# Patient Record
Sex: FEMALE | Race: WHITE | NOT HISPANIC OR LATINO | Employment: OTHER | URBAN - METROPOLITAN AREA
[De-identification: names, ages, dates, MRNs, and addresses within clinical notes are randomized per-mention and may not be internally consistent; named-entity substitution may affect disease eponyms.]

---

## 2017-05-02 PROBLEM — R06.02 SOB (SHORTNESS OF BREATH): Status: ACTIVE | Noted: 2017-05-02

## 2017-05-02 PROBLEM — I20.0 UNSTABLE ANGINA: Status: ACTIVE | Noted: 2017-05-02

## 2017-05-02 PROBLEM — I27.20 PULMONARY HYPERTENSION: Status: ACTIVE | Noted: 2017-05-02

## 2017-11-29 PROBLEM — I25.84 CORONARY ARTERY DISEASE DUE TO CALCIFIED CORONARY LESION: Status: ACTIVE | Noted: 2017-11-29

## 2017-11-29 PROBLEM — I25.10 CORONARY ARTERY DISEASE DUE TO CALCIFIED CORONARY LESION: Status: ACTIVE | Noted: 2017-11-29

## 2018-07-10 PROBLEM — E11.51 TYPE 2 DIABETES MELLITUS WITH DIABETIC PERIPHERAL ANGIOPATHY WITHOUT GANGRENE, WITHOUT LONG-TERM CURRENT USE OF INSULIN: Status: ACTIVE | Noted: 2018-07-10

## 2018-07-10 PROBLEM — R25.2 MUSCLE CRAMPS: Status: ACTIVE | Noted: 2018-07-10

## 2018-07-10 PROBLEM — F41.8 SITUATIONAL ANXIETY: Status: ACTIVE | Noted: 2018-07-10

## 2019-02-04 PROBLEM — C18.9 MALIGNANT NEOPLASM OF COLON: Status: ACTIVE | Noted: 2019-02-04

## 2019-04-15 PROBLEM — I20.89 ANGINAL EQUIVALENT: Status: ACTIVE | Noted: 2019-04-15

## 2019-10-07 ENCOUNTER — OFFICE VISIT (OUTPATIENT)
Dept: PODIATRY | Facility: CLINIC | Age: 69
End: 2019-10-07
Payer: MEDICARE

## 2019-10-07 VITALS
HEIGHT: 66 IN | HEART RATE: 72 BPM | RESPIRATION RATE: 18 BRPM | SYSTOLIC BLOOD PRESSURE: 132 MMHG | BODY MASS INDEX: 28.98 KG/M2 | WEIGHT: 180.31 LBS | DIASTOLIC BLOOD PRESSURE: 71 MMHG

## 2019-10-07 DIAGNOSIS — B35.3 TINEA PEDIS, RIGHT: ICD-10-CM

## 2019-10-07 DIAGNOSIS — B35.1 ONYCHOMYCOSIS DUE TO DERMATOPHYTE: ICD-10-CM

## 2019-10-07 DIAGNOSIS — E11.42 DIABETIC POLYNEUROPATHY ASSOCIATED WITH TYPE 2 DIABETES MELLITUS: Primary | ICD-10-CM

## 2019-10-07 PROCEDURE — 11721 DEBRIDE NAIL 6 OR MORE: CPT | Mod: Q9,S$GLB,, | Performed by: PODIATRIST

## 2019-10-07 PROCEDURE — 3075F PR MOST RECENT SYSTOLIC BLOOD PRESS GE 130-139MM HG: ICD-10-PCS | Mod: CPTII,S$GLB,, | Performed by: PODIATRIST

## 2019-10-07 PROCEDURE — 99999 PR PBB SHADOW E&M-EST. PATIENT-LVL III: ICD-10-PCS | Mod: PBBFAC,,, | Performed by: PODIATRIST

## 2019-10-07 PROCEDURE — 3078F DIAST BP <80 MM HG: CPT | Mod: CPTII,S$GLB,, | Performed by: PODIATRIST

## 2019-10-07 PROCEDURE — 99203 OFFICE O/P NEW LOW 30 MIN: CPT | Mod: 25,S$GLB,, | Performed by: PODIATRIST

## 2019-10-07 PROCEDURE — 3078F PR MOST RECENT DIASTOLIC BLOOD PRESSURE < 80 MM HG: ICD-10-PCS | Mod: CPTII,S$GLB,, | Performed by: PODIATRIST

## 2019-10-07 PROCEDURE — 11721 PR DEBRIDEMENT OF NAILS, 6 OR MORE: ICD-10-PCS | Mod: Q9,S$GLB,, | Performed by: PODIATRIST

## 2019-10-07 PROCEDURE — 99999 PR PBB SHADOW E&M-EST. PATIENT-LVL III: CPT | Mod: PBBFAC,,, | Performed by: PODIATRIST

## 2019-10-07 PROCEDURE — 99203 PR OFFICE/OUTPT VISIT, NEW, LEVL III, 30-44 MIN: ICD-10-PCS | Mod: 25,S$GLB,, | Performed by: PODIATRIST

## 2019-10-07 PROCEDURE — 3044F HG A1C LEVEL LT 7.0%: CPT | Mod: CPTII,S$GLB,, | Performed by: PODIATRIST

## 2019-10-07 PROCEDURE — 3075F SYST BP GE 130 - 139MM HG: CPT | Mod: CPTII,S$GLB,, | Performed by: PODIATRIST

## 2019-10-07 PROCEDURE — 1101F PT FALLS ASSESS-DOCD LE1/YR: CPT | Mod: CPTII,S$GLB,, | Performed by: PODIATRIST

## 2019-10-07 PROCEDURE — 3044F PR MOST RECENT HEMOGLOBIN A1C LEVEL <7.0%: ICD-10-PCS | Mod: CPTII,S$GLB,, | Performed by: PODIATRIST

## 2019-10-07 PROCEDURE — 1101F PR PT FALLS ASSESS DOC 0-1 FALLS W/OUT INJ PAST YR: ICD-10-PCS | Mod: CPTII,S$GLB,, | Performed by: PODIATRIST

## 2019-10-08 ENCOUNTER — TELEPHONE (OUTPATIENT)
Dept: PODIATRY | Facility: CLINIC | Age: 69
End: 2019-10-08

## 2019-10-08 DIAGNOSIS — B35.3 TINEA PEDIS, RIGHT: Primary | ICD-10-CM

## 2019-10-08 RX ORDER — ECONAZOLE NITRATE 10 MG/G
CREAM TOPICAL 2 TIMES DAILY
Qty: 30 G | Refills: 3 | Status: SHIPPED | OUTPATIENT
Start: 2019-10-08 | End: 2020-12-28

## 2019-10-08 NOTE — PROGRESS NOTES
Subjective:      Patient ID: Reji Iraheta is a 69 y.o. female.    Chief Complaint: Diabetes Mellitus; Nail Care; Peripheral Neuropathy; and Nail Problem    Reji is a 69 y.o. female who presents to the clinic for evaluation and treatment of diabetic feet. Reji has a past medical history of Bronchitis, Colon cancer, Hiatal hernia, keloid of skin, Hypertension, Raynaud's disease, Reflux, and SOB (shortness of breath). Patient relates having toenails that are fungally infected and in need of trimming.  Denies experiencing pain from the nails with today's exam.  Has not attempted to self treat.  Also, relates having burning/tingling pain secondary to neuropathy, however, notes this is well managed with taking gabapentin daily.  Lastly, she relates having a rash between the Rt. 3rd and 4th toes that has failed to resolve.  Relates attempting use of OTC antifungals with limited success.  Notes the site itches to touch.  Denies any additional pedal complaints.      PCP: NATANAEL Muñiz Jr, MD    Date Last Seen by PCP: 9/5/19    Hemoglobin A1C   Date Value Ref Range Status   08/13/2019 5.4 <5.7 % of total Hgb Final     Comment:     For the purpose of screening for the presence of  diabetes:     <5.7%       Consistent with the absence of diabetes  5.7-6.4%    Consistent with increased risk for diabetes              (prediabetes)  > or =6.5%  Consistent with diabetes     This assay result is consistent with a decreased risk  of diabetes.     Currently, no consensus exists regarding use of  hemoglobin A1c for diagnosis of diabetes in children.     According to American Diabetes Association (ADA)  guidelines, hemoglobin A1c <7.0% represents optimal  control in non-pregnant diabetic patients. Different  metrics may apply to specific patient populations.   Standards of Medical Care in Diabetes(ADA).         12/11/2018 6.5 (H) <5.7 % of total Hgb Final     Comment:     For someone without known diabetes, a hemoglobin  A1c  value of 6.5% or greater indicates that they may have   diabetes and this should be confirmed with a follow-up   test.     For someone with known diabetes, a value <7% indicates   that their diabetes is well controlled and a value   greater than or equal to 7% indicates suboptimal   control. A1c targets should be individualized based on   duration of diabetes, age, comorbid conditions, and   other considerations.     Currently, no consensus exists regarding use of  hemoglobin A1c for diagnosis of diabetes for children.                 Past Medical History:   Diagnosis Date    Bronchitis     Colon cancer     Hiatal hernia     Hx of keloid of skin     Hypertension     Raynaud's disease     Reflux     SOB (shortness of breath)        Past Surgical History:   Procedure Laterality Date    APPENDECTOMY      CARDIAC CATHETERIZATION      CARPAL TUNNEL RELEASE Right     COLON SURGERY  2019    Marimar valentin MD  - colon cancer - second sx     COLONOSCOPY  10/25/2013    Dr. Jamar Novak    ESOPHAGOGASTRODUODENOSCOPY  01/15/2019    PHARYNGEAL DILITATION      TONSILLECTOMY      TUBAL LIGATION         Family History   Problem Relation Age of Onset    Heart disease Mother     Rheum arthritis Mother     Hypertension Father          following fractured hip.     Hypertension Daughter     Breast cancer Maternal Aunt         age unknown    Ovarian cancer Maternal Grandmother        Social History     Socioeconomic History    Marital status:      Spouse name: Not on file    Number of children: 2    Years of education: Not on file    Highest education level: Not on file   Occupational History    Not on file   Social Needs    Financial resource strain: Not on file    Food insecurity:     Worry: Not on file     Inability: Not on file    Transportation needs:     Medical: Not on file     Non-medical: Not on file   Tobacco Use    Smoking status: Never Smoker    Smokeless tobacco:  Never Used   Substance and Sexual Activity    Alcohol use: No    Drug use: Never    Sexual activity: Not Currently     Partners: Male     Birth control/protection: Post-menopausal, See Surgical Hx, None   Lifestyle    Physical activity:     Days per week: Not on file     Minutes per session: Not on file    Stress: Only a little   Relationships    Social connections:     Talks on phone: Not on file     Gets together: Not on file     Attends Hinduism service: Not on file     Active member of club or organization: Not on file     Attends meetings of clubs or organizations: Not on file     Relationship status: Not on file   Other Topics Concern    Not on file   Social History Narrative    Not on file       Current Outpatient Medications   Medication Sig Dispense Refill    acetaminophen (TYLENOL) 500 MG tablet Take by mouth.      blood sugar diagnostic Strp To check BG 2 times daily, to use with insurance preferred meter 200 each 3    blood-glucose meter kit To check BG 2 times daily, to use with insurance preferred meter 1 each kit    clonazePAM (KLONOPIN) 0.5 MG tablet Take 3 at bedtime 90 tablet 2    gabapentin (NEURONTIN) 100 MG capsule TAKE 1 CAPSULE THREE TIMES DAILY 270 capsule 11    JANUVIA 50 mg Tab TAKE 1 TABLET ONE TIME DAILY 90 tablet 4    lancets Misc To check BG 2 times daily, to use with insurance preferred meter 200 each 3    metoprolol succinate (TOPROL-XL) 25 MG 24 hr tablet Take 1 tablet (25 mg total) by mouth once daily. 30 tablet 0    polyethylene glycol 3350 (MIRALAX ORAL) Take by mouth.      aspirin (ECOTRIN) 81 MG EC tablet Take 1 tablet (81 mg total) by mouth once daily.  0    azelastine (ASTELIN) 137 mcg (0.1 %) nasal spray 1 spray (137 mcg total) by Nasal route 2 (two) times daily. 30 mL 1    escitalopram oxalate (LEXAPRO) 10 MG tablet Take 1 tablet (10 mg total) by mouth once daily. 90 tablet 3    levocetirizine (XYZAL) 5 MG tablet Take 1 tablet (5 mg total) by mouth  every evening. 30 tablet 2     No current facility-administered medications for this visit.        Review of patient's allergies indicates:   Allergen Reactions    Arb-angiotensin receptor antagonist Other (See Comments)     Uvulitis      Dyazide [triamterene-hydrochlorothiazid] Other (See Comments)     LLQ pain    Losartan Other (See Comments)     Throat swollen    Metformin Diarrhea    Ace inhibitors Other (See Comments)     cough    Elavil [amitriptyline] Swelling    Tramadol Itching    Vicodin [hydrocodone-acetaminophen] Anxiety         Review of Systems   Constitution: Negative for chills and fever.   Cardiovascular: Negative for claudication and leg swelling.   Skin: Positive for color change, dry skin, itching and nail changes.   Musculoskeletal: Negative for muscle cramps, muscle weakness and myalgias.   Neurological: Positive for paresthesias. Negative for numbness.   Psychiatric/Behavioral: Negative for altered mental status.           Objective:      Physical Exam   Constitutional: She is oriented to person, place, and time. She appears well-developed and well-nourished. No distress.   Cardiovascular:   Pulses:       Dorsalis pedis pulses are 2+ on the right side, and 2+ on the left side.        Posterior tibial pulses are 2+ on the right side, and 2+ on the left side.   CFT is < 3 seconds bilateral.  Pedal hair growth is decreased bilateral.  Mild nonpitting lower extremity edema noted bilateral.  Toes are cool to touch bilateral.   Musculoskeletal: She exhibits no edema or tenderness.   Muscle strength 5/5 in all muscle groups bilateral.  No tenderness nor crepitation with ROM of foot/ankle joints bilateral.  No tenderness with palpation of bilateral foot and ankle.  Bilateral semi-reducible contracture of toes 2-5.  Bilateral hallux abducto valgus.   Neurological: She is alert and oriented to person, place, and time. She has normal strength. A sensory deficit is present.   Protective sensation  per [place]-Zeferino monofilament is intact bilateral.  Vibratory sensation is decreased bilateral.  Light touch is intact bilateral.   Skin: Skin is warm, dry and intact. Capillary refill takes less than 2 seconds. Rash noted. No abrasion, no bruising, no burn, no ecchymosis, no laceration and no lesion noted. Rash is papular. She is not diaphoretic. No erythema.   Pedal skin appears thin bilateral.  The Rt. 3rd-5th toes appear thickened by 2 mm's, elongated by 4 mm's, and discolored with subungual debris. Remaining toenails x 7 appear normotrophic but elongated by 4 mm's.  Dry, scaly papular rash noted to the Rt. 3rd and 4th webspaces.  No break in adjacent skin integrity noted.  No localized sign of infection is present.              Assessment:       Encounter Diagnoses   Name Primary?    Diabetic polyneuropathy associated with type 2 diabetes mellitus Yes    Onychomycosis due to dermatophyte     Tinea pedis, right          Plan:       Reji was seen today for diabetes mellitus, nail care, peripheral neuropathy and nail problem.    Diagnoses and all orders for this visit:    Diabetic polyneuropathy associated with type 2 diabetes mellitus    Onychomycosis due to dermatophyte    Tinea pedis, right      I counseled the patient on her conditions, their implications and medical management.    Advised to begin applying 1% clotrimazole cream to the Rt. 3rd and 4th webspaces BID x 2 weeks.    Discussed with patient a variety of treatment options to address onychomycosis including [employer] Vaporub, topical/oral antifungals, and laser therapy.    Advised to regularly clean shoe gear and shower surfaces to limit exposure.    Advised to be wary of walking barefoot on carpeted surfaces at gyms and hotel rooms.  Also, avoid barefoot walking at public showers and pool areas.    Shoe inspection. Diabetic Foot Education. Patient reminded of the importance of good nutrition and blood sugar control to help prevent podiatric  complications of diabetes. Patient instructed on proper foot hygeine. We discussed wearing proper shoe gear, daily foot inspections, never walking without protective shoe gear, never putting sharp instruments to feet    With patient's permission, nails were aggressively reduced and debrided x 10 to their soft tissue attachment mechanically and with electric , removing all offending nail and debris. Patient relates relief following the procedure. She will continue to monitor the areas daily, inspect her feet, wear protective shoe gear when ambulatory, moisturizer to maintain skin integrity and follow in this office in approximately 4 months, sooner p.r.n.    Follow up in about 4 months (around 2/7/2020).    Fabrizio Pascal DPM

## 2019-10-08 NOTE — TELEPHONE ENCOUNTER
----- Message from Elizabeth Carlisle sent at 10/8/2019 11:57 AM CDT -----  Contact: Reji pt  Type: Needs Medical Advice    Who Called:  Reji  Symptoms (please be specific):  Fungus on her foot  How long has patient had these symptoms:  ongoing  Pharmacy name and phone #:      [id number] - [place], [place] - [address]  [address]  [facility] [address]  Phone: [phone] Fax: [phone]      Best Call Back Number: [phone]  Additional Information: Pls call pt regarding sending her something for the fungus.  Clotrimazole is not working for her

## 2020-09-01 PROBLEM — I20.89 ANGINAL EQUIVALENT: Status: RESOLVED | Noted: 2019-04-15 | Resolved: 2020-09-01

## 2020-09-01 PROBLEM — Z79.899 ON STATIN THERAPY: Status: ACTIVE | Noted: 2020-09-01

## 2020-09-02 PROBLEM — R06.02 SOB (SHORTNESS OF BREATH): Status: RESOLVED | Noted: 2017-05-02 | Resolved: 2020-09-02

## 2020-09-02 PROBLEM — R25.2 MUSCLE CRAMPS: Status: RESOLVED | Noted: 2018-07-10 | Resolved: 2020-09-02

## 2021-03-29 ENCOUNTER — IMMUNIZATION (OUTPATIENT)
Dept: FAMILY MEDICINE | Facility: CLINIC | Age: 71
End: 2021-03-29
Payer: MEDICARE

## 2021-03-29 DIAGNOSIS — Z23 NEED FOR VACCINATION: Primary | ICD-10-CM

## 2021-03-29 PROCEDURE — 91300 COVID-19, MRNA, LNP-S, PF, 30 MCG/0.3 ML DOSE VACCINE: CPT | Mod: PBBFAC | Performed by: FAMILY MEDICINE

## 2021-04-19 ENCOUNTER — IMMUNIZATION (OUTPATIENT)
Dept: FAMILY MEDICINE | Facility: CLINIC | Age: 71
End: 2021-04-19
Payer: MEDICARE

## 2021-04-19 DIAGNOSIS — Z23 NEED FOR VACCINATION: Primary | ICD-10-CM

## 2021-04-19 PROCEDURE — 0002A COVID-19, MRNA, LNP-S, PF, 30 MCG/0.3 ML DOSE VACCINE: CPT | Mod: PBBFAC | Performed by: INTERNAL MEDICINE

## 2021-04-19 PROCEDURE — 91300 COVID-19, MRNA, LNP-S, PF, 30 MCG/0.3 ML DOSE VACCINE: CPT | Mod: PBBFAC | Performed by: INTERNAL MEDICINE

## 2021-07-20 PROBLEM — H25.9 AGE-RELATED CATARACT OF BOTH EYES: Chronic | Status: ACTIVE | Noted: 2021-07-20

## 2021-07-20 PROBLEM — E11.36 DIABETES MELLITUS WITH CATARACT: Chronic | Status: ACTIVE | Noted: 2021-07-20

## 2021-10-12 PROBLEM — E11.42 DIABETIC PERIPHERAL NEUROPATHY ASSOCIATED WITH TYPE 2 DIABETES MELLITUS: Status: ACTIVE | Noted: 2021-10-12

## 2021-10-12 PROBLEM — I63.9 CEREBROVASCULAR ACCIDENT (CVA) DUE TO EMBOLISM: Status: ACTIVE | Noted: 2021-10-12

## 2022-05-24 PROBLEM — Z85.038 HISTORY OF COLON CANCER: Status: ACTIVE | Noted: 2022-05-24

## 2022-07-07 ENCOUNTER — TELEPHONE (OUTPATIENT)
Dept: PODIATRY | Facility: CLINIC | Age: 72
End: 2022-07-07
Payer: MEDICARE

## 2022-07-07 NOTE — TELEPHONE ENCOUNTER
----- Message from Kindra Kwan, Patient Care Assistant sent at 7/7/2022  2:23 PM CDT -----  Regarding: appointment  Contact: pt  Type:  Sooner Appointment Request    Caller is requesting a sooner appointment.  Caller declined first available appointment listed below.  Caller will not accept being placed on the waitlist and is requesting a message be sent to doctor.    Name of Caller:  pt   When is the first available appointment?  8/2022  Symptoms:  new pt est care - toe infected with fungus   Best Call Back Number:  [phone] (home)     Additional Information:  please call pt to advise. Thanks!

## 2022-07-11 ENCOUNTER — OFFICE VISIT (OUTPATIENT)
Dept: PODIATRY | Facility: CLINIC | Age: 72
End: 2022-07-11
Payer: MEDICARE

## 2022-07-11 VITALS — BODY MASS INDEX: 30.76 KG/M2 | WEIGHT: 191.38 LBS | HEIGHT: 66 IN

## 2022-07-11 DIAGNOSIS — B35.1 ONYCHOMYCOSIS DUE TO DERMATOPHYTE: ICD-10-CM

## 2022-07-11 DIAGNOSIS — L60.0 INGROWN NAIL: Primary | ICD-10-CM

## 2022-07-11 DIAGNOSIS — E11.42 DIABETIC POLYNEUROPATHY ASSOCIATED WITH TYPE 2 DIABETES MELLITUS: ICD-10-CM

## 2022-07-11 PROCEDURE — 3051F PR MOST RECENT HEMOGLOBIN A1C LEVEL 7.0 - < 8.0%: ICD-10-PCS | Mod: CPTII,S$GLB,, | Performed by: PODIATRIST

## 2022-07-11 PROCEDURE — 3288F FALL RISK ASSESSMENT DOCD: CPT | Mod: CPTII,S$GLB,, | Performed by: PODIATRIST

## 2022-07-11 PROCEDURE — 99999 PR PBB SHADOW E&M-EST. PATIENT-LVL II: ICD-10-PCS | Mod: PBBFAC,,, | Performed by: PODIATRIST

## 2022-07-11 PROCEDURE — 3008F BODY MASS INDEX DOCD: CPT | Mod: CPTII,S$GLB,, | Performed by: PODIATRIST

## 2022-07-11 PROCEDURE — 3061F PR NEG MICROALBUMINURIA RESULT DOCUMENTED/REVIEW: ICD-10-PCS | Mod: CPTII,S$GLB,, | Performed by: PODIATRIST

## 2022-07-11 PROCEDURE — 3061F NEG MICROALBUMINURIA REV: CPT | Mod: CPTII,S$GLB,, | Performed by: PODIATRIST

## 2022-07-11 PROCEDURE — 1126F AMNT PAIN NOTED NONE PRSNT: CPT | Mod: CPTII,S$GLB,, | Performed by: PODIATRIST

## 2022-07-11 PROCEDURE — 3288F PR FALLS RISK ASSESSMENT DOCUMENTED: ICD-10-PCS | Mod: CPTII,S$GLB,, | Performed by: PODIATRIST

## 2022-07-11 PROCEDURE — 3066F NEPHROPATHY DOC TX: CPT | Mod: CPTII,S$GLB,, | Performed by: PODIATRIST

## 2022-07-11 PROCEDURE — 3008F PR BODY MASS INDEX (BMI) DOCUMENTED: ICD-10-PCS | Mod: CPTII,S$GLB,, | Performed by: PODIATRIST

## 2022-07-11 PROCEDURE — 1126F PR PAIN SEVERITY QUANTIFIED, NO PAIN PRESENT: ICD-10-PCS | Mod: CPTII,S$GLB,, | Performed by: PODIATRIST

## 2022-07-11 PROCEDURE — 99213 OFFICE O/P EST LOW 20 MIN: CPT | Mod: S$GLB,,, | Performed by: PODIATRIST

## 2022-07-11 PROCEDURE — 99999 PR PBB SHADOW E&M-EST. PATIENT-LVL II: CPT | Mod: PBBFAC,,, | Performed by: PODIATRIST

## 2022-07-11 PROCEDURE — 3051F HG A1C>EQUAL 7.0%<8.0%: CPT | Mod: CPTII,S$GLB,, | Performed by: PODIATRIST

## 2022-07-11 PROCEDURE — 3066F PR DOCUMENTATION OF TREATMENT FOR NEPHROPATHY: ICD-10-PCS | Mod: CPTII,S$GLB,, | Performed by: PODIATRIST

## 2022-07-11 PROCEDURE — 1101F PR PT FALLS ASSESS DOC 0-1 FALLS W/OUT INJ PAST YR: ICD-10-PCS | Mod: CPTII,S$GLB,, | Performed by: PODIATRIST

## 2022-07-11 PROCEDURE — 99213 PR OFFICE/OUTPT VISIT, EST, LEVL III, 20-29 MIN: ICD-10-PCS | Mod: S$GLB,,, | Performed by: PODIATRIST

## 2022-07-11 PROCEDURE — 1101F PT FALLS ASSESS-DOCD LE1/YR: CPT | Mod: CPTII,S$GLB,, | Performed by: PODIATRIST

## 2022-07-11 RX ORDER — MUPIROCIN 20 MG/G
OINTMENT TOPICAL DAILY
Qty: 30 G | Refills: 1 | Status: SHIPPED | OUTPATIENT
Start: 2022-07-11 | End: 2024-02-07

## 2022-07-11 RX ORDER — GABAPENTIN 300 MG/1
CAPSULE ORAL
COMMUNITY
End: 2022-07-13 | Stop reason: SDUPTHER

## 2022-07-11 RX ORDER — METRONIDAZOLE 500 MG/1
TABLET ORAL
COMMUNITY
End: 2022-07-27

## 2022-07-11 RX ORDER — DIAZEPAM 2 MG/1
TABLET ORAL
COMMUNITY
End: 2022-07-27

## 2022-07-11 RX ORDER — ONDANSETRON 4 MG/1
TABLET, ORALLY DISINTEGRATING ORAL
COMMUNITY
End: 2023-07-31

## 2022-07-11 RX ORDER — ACETAMINOPHEN AND CODEINE PHOSPHATE 300; 30 MG/1; MG/1
TABLET ORAL
COMMUNITY
End: 2023-01-27

## 2022-07-11 NOTE — PROGRESS NOTES
"Subjective:      Patient ID: Reji Iraheta is a 72 y.o. female.    Chief Complaint: Nail Problem (2nd toe was red and inflamed ,4th toe nail lifted off) and Diabetes Mellitus    Reji is a 72 y.o. female with a past medical history of Bronchitis, Colon cancer (2019), Edema, Hiatal hernia, keloid of skin, Hypertension, Raynaud's disease, Reflux, and SOB (shortness of breath). Patient relates having a previously infected ingrown Rt. 2nd toenail.  States this appeared "red and angry" last week, however, states the site has since healed on its own.  When symptomatic, pain from the nail was noted to be sharp and aggravated with pressure.  Notes some residual pain at the site.  Inquires as to whether or not intervention is warranted.   Also, notes recent avulsion of the Rt. 4th toenail.  States the nail detached from the underlying nail bed with subsequent avulsion.  Denies this begin associated with pain.  Inquires as to whether the nail will eventually grow to length.   Lastly, notes a recurring fungal infection to the dorsal 3rd webspace of the Rt. foot.  Denies pruritis from the site with today's exam.  Has not attempted to treat with the past Rx of clotrimazole. Denies any additional pedal complaints.      PCP: NATANAEL Muñiz Jr, MD    Date Last Seen by PCP: 1/22    Hemoglobin A1C   Date Value Ref Range Status   01/12/2022 7.1 (H) <5.7 % of total Hgb Final     Comment:     For someone without known diabetes, a hemoglobin A1c  value of 6.5% or greater indicates that they may have   diabetes and this should be confirmed with a follow-up   test.     For someone with known diabetes, a value <7% indicates   that their diabetes is well controlled and a value   greater than or equal to 7% indicates suboptimal   control. A1c targets should be individualized based on   duration of diabetes, age, comorbid conditions, and   other considerations.     Currently, no consensus exists regarding use of  hemoglobin A1c for diagnosis " of diabetes for children.         07/15/2021 6.2 (H) <5.7 % of total Hgb Final     Comment:     For someone without known diabetes, a hemoglobin   A1c value between 5.7% and 6.4% is consistent with  prediabetes and should be confirmed with a   follow-up test.     For someone with known diabetes, a value <7%  indicates that their diabetes is well controlled. A1c  targets should be individualized based on duration of  diabetes, age, comorbid conditions, and other  considerations.     This assay result is consistent with an increased risk  of diabetes.     Currently, no consensus exists regarding use of  hemoglobin A1c for diagnosis of diabetes for children.        12/09/2020 6.8 (H) <5.7 % of total Hgb Final     Comment:     For someone without known diabetes, a hemoglobin A1c  value of 6.5% or greater indicates that they may have   diabetes and this should be confirmed with a follow-up   test.     For someone with known diabetes, a value <7% indicates   that their diabetes is well controlled and a value   greater than or equal to 7% indicates suboptimal   control. A1c targets should be individualized based on   duration of diabetes, age, comorbid conditions, and   other considerations.     Currently, no consensus exists regarding use of  hemoglobin A1c for diagnosis of diabetes for children.                 Past Medical History:   Diagnosis Date    Bronchitis     Colon cancer 2019    Edema     feet/ankle    Hiatal hernia     Hx of keloid of skin     Hypertension     Raynaud's disease     Reflux     SOB (shortness of breath)        Past Surgical History:   Procedure Laterality Date    APPENDECTOMY      CARDIAC CATHETERIZATION      CARPAL TUNNEL RELEASE Right     COLON SURGERY  05/2019    Marimar valentin MD  - colon cancer - second sx     COLONOSCOPY  10/25/2013    Dr. Jamar Novak    COLONOSCOPY W/ BIOPSIES  01/22/2020    ESOPHAGOGASTRODUODENOSCOPY  01/15/2019    PHARYNGEAL DILITATION       TONSILLECTOMY      TUBAL LIGATION         Family History   Problem Relation Age of Onset    Heart disease Mother     Rheum arthritis Mother     Hypertension Father          following fractured hip.     Hypertension Daughter     Cancer Maternal Aunt     Ovarian cancer Maternal Grandmother     Cancer Maternal Grandmother     Breast cancer Paternal Aunt        Social History     Socioeconomic History    Marital status:     Number of children: 2   Tobacco Use    Smoking status: Never Smoker    Smokeless tobacco: Never Used   Substance and Sexual Activity    Alcohol use: No    Drug use: Never    Sexual activity: Not Currently     Partners: Male     Birth control/protection: Post-menopausal, See Surgical Hx, None       Current Outpatient Medications   Medication Sig Dispense Refill    acetaminophen (TYLENOL) 500 MG tablet Take by mouth.      acetaminophen-codeine 300-30mg (TYLENOL #3) 300-30 mg Tab acetaminophen 300 mg-codeine 30 mg tablet   TAKE 1 TABLET BY MOUTH EVERY 8 HOURS AS NEEDED FOR PAIN      alcohol swabs (BD ALCOHOL SWABS) PadM Apply 1 each topically once daily. 100 each 3    atorvastatin (LIPITOR) 20 MG tablet TAKE 1 TABLET (20 MG TOTAL) BY MOUTH ONCE DAILY. 90 tablet 3    azelastine (ASTELIN) 137 mcg (0.1 %) nasal spray 1 SPRAY (137 MCG TOTAL) BY NASAL ROUTE 2 (TWO) TIMES DAILY. 30 mL 1    blood glucose control, low (TRUE METRIX LEVEL 1) Soln 1 application by Misc.(Non-Drug; Combo Route) route once daily. 3 each 3    blood sugar diagnostic Strp To check BG 2 times daily, to use with insurance preferred meter 200 each 3    blood-glucose meter (TRUE METRIX AIR GLUCOSE METER) kit Use daily prn glucose testing 1 each 0    clonazePAM (KLONOPIN) 0.5 MG tablet TAKE 3 TABLETS BY MOUTH AT BEDTIME 90 tablet 3    diazePAM (VALIUM) 2 MG tablet diazepam 2 mg tablet   TAKE ONE TABLET 30 MIN PRIOR TO MRI THEN TAKE 2ND TABLET      esomeprazole (NEXIUM) 40 MG capsule Take 1 capsule (40  mg total) by mouth before breakfast. 90 capsule 3    folic acid/multivit-min/lutein (CENTRUM SILVER ORAL) Take 1 tablet by mouth once daily.      gabapentin (NEURONTIN) 100 MG capsule Take 1 capsule (100 mg total) by mouth 3 (three) times daily. (Patient taking differently: Take 300 mg by mouth 2 (two) times daily. Per dr boone) 270 capsule 3    gabapentin (NEURONTIN) 300 MG capsule gabapentin 300 mg capsule   TAKE 1 CAPSULE BY MOUTH THREE TIMES A DAY      JANUVIA 50 mg Tab TAKE 1 TABLET EVERY DAY 90 tablet 3    ketorolac 0.5% (ACULAR) 0.5 % Drop       lancets Misc To check BG 2 times daily, to use with insurance preferred meter 200 each 3    metoprolol succinate (TOPROL-XL) 25 MG 24 hr tablet TAKE 1 TABLET (25 MG TOTAL) BY MOUTH ONCE DAILY. 90 tablet 3    metroNIDAZOLE (FLAGYL) 500 MG tablet metronidazole 500 mg tablet   TAKE 1 TABLET (500 MG TOTAL) BY MOUTH 3 (THREE) TIMES DAILY. FOR 7 DAYS      mupirocin (BACTROBAN) 2 % ointment Apply topically once daily. 30 g 1    mv-mn/iron/folic acid/herb 190 (VITAMIN D3 COMPLETE ORAL) Take 1 tablet by mouth once daily.      nystatin (MYCOSTATIN) powder Apply topically 3 (three) times daily. 30 g 1    ondansetron (ZOFRAN-ODT) 4 MG TbDL ondansetron 4 mg disintegrating tablet   TAKE 1 TABLET (4 MG TOTAL) BY MOUTH ONCE FOR 1 DOSE      polyethylene glycol 3350 (MIRALAX ORAL) Take by mouth.      spironolactone (ALDACTONE) 25 MG tablet TAKE 1 TABLET (25 MG TOTAL) BY MOUTH ONCE DAILY. 90 tablet 3     No current facility-administered medications for this visit.       Review of patient's allergies indicates:   Allergen Reactions    Arb-angiotensin receptor antagonist Other (See Comments)     Uvulitis      Dyazide [triamterene-hydrochlorothiazid] Other (See Comments)     LLQ pain    Losartan Other (See Comments)     Throat swollen    Metformin Diarrhea    Ace inhibitors Other (See Comments)     cough    Elavil [amitriptyline] Swelling    Tramadol Itching     Vicodin [hydrocodone-acetaminophen] Anxiety         Review of Systems   Constitutional: Negative for chills and fever.   Cardiovascular: Negative for claudication and leg swelling.   Skin: Positive for color change, dry skin, itching and nail changes.   Musculoskeletal: Negative for muscle cramps, muscle weakness and myalgias.   Neurological: Positive for paresthesias. Negative for numbness.   Psychiatric/Behavioral: Negative for altered mental status.           Objective:      Physical Exam  Constitutional:       General: She is not in acute distress.     Appearance: She is well-developed. She is not diaphoretic.   Cardiovascular:      Pulses:           Dorsalis pedis pulses are 2+ on the right side and 2+ on the left side.        Posterior tibial pulses are 2+ on the right side and 2+ on the left side.      Comments: CFT is < 3 seconds bilateral.  Pedal hair growth is decreased bilateral.  Mild nonpitting lower extremity edema noted bilateral.  Toes are cool to touch bilateral.  Musculoskeletal:         General: No tenderness.      Comments: Muscle strength 5/5 in all muscle groups bilateral.  No tenderness nor crepitation with ROM of foot/ankle joints bilateral.  Mild pain with palpation to the medial nail fold of the Rt. 2nd toe.   Bilateral semi-reducible contracture of toes 2-5.  Bilateral hallux abducto valgus.   Skin:     General: Skin is warm and dry.      Capillary Refill: Capillary refill takes less than 2 seconds.      Findings: Rash present. No abrasion, bruising, burn, ecchymosis, erythema, laceration or lesion. Rash is papular.      Comments: Pedal skin appears thin bilateral.  Avulsion of the Rt. 4th toenail with exposed, epithelialized nail bed.  Incurvation noted to the medial border of the Rt. 2nd toenail.  No localized sign of infection noted.  The Rt. 3rd and 5th toenails appear mycotic.  Remaining toenails x 6 appear normotrophic.   Dry, scaly papular rash noted to the Rt. 3rd and 4th  webspaces.  No break in adjacent skin integrity noted.  No localized sign of infection is present.    Neurological:      Mental Status: She is alert and oriented to person, place, and time.      Sensory: Sensory deficit present.      Comments: Protective sensation per [place]-Zeferino monofilament is intact bilateral.  Vibratory sensation is decreased bilateral.  Light touch is intact bilateral.               Assessment:       Encounter Diagnoses   Name Primary?    Ingrown nail Yes    Onychomycosis due to dermatophyte     Diabetic polyneuropathy associated with type 2 diabetes mellitus          Plan:       Reji was seen today for nail problem and diabetes mellitus.    Diagnoses and all orders for this visit:    Ingrown nail  -     mupirocin (BACTROBAN) 2 % ointment; Apply topically once daily.    Onychomycosis due to dermatophyte    Diabetic polyneuropathy associated with type 2 diabetes mellitus      I counseled the patient on her conditions, their implications and medical management.    Advised to resume applying 1% clotrimazole cream to the Rt. 3rd and 4th webspaces BID x 2 weeks.    Discussed treatment options for ingrowing nails including matrixectomy vs slant back procedure. Patient elects for a total matrixectomy of the Rt. 2nd and 4th toes.  This is to be scheduled at her convenience.      In the meantime, she is to apply mupirocin to the Rt. 2nd nail groove once daily to minimize recurrence of infection to the area.     RTC as mentioned.    Fabrizio Pascal DPM

## 2022-07-24 PROBLEM — I25.84 CORONARY ARTERY DISEASE DUE TO CALCIFIED CORONARY LESION: Chronic | Status: ACTIVE | Noted: 2017-11-29

## 2022-07-24 PROBLEM — I25.10 CORONARY ARTERY DISEASE DUE TO CALCIFIED CORONARY LESION: Chronic | Status: ACTIVE | Noted: 2017-11-29

## 2022-07-24 PROBLEM — C18.9 MALIGNANT NEOPLASM OF COLON: Chronic | Status: ACTIVE | Noted: 2019-02-04

## 2022-07-24 PROBLEM — E11.51 TYPE 2 DIABETES MELLITUS WITH DIABETIC PERIPHERAL ANGIOPATHY WITHOUT GANGRENE, WITHOUT LONG-TERM CURRENT USE OF INSULIN: Chronic | Status: ACTIVE | Noted: 2018-07-10

## 2022-07-28 ENCOUNTER — TELEPHONE (OUTPATIENT)
Dept: PODIATRY | Facility: CLINIC | Age: 72
End: 2022-07-28
Payer: MEDICARE

## 2022-07-28 NOTE — TELEPHONE ENCOUNTER
----- Message from Ailyn Buchanan sent at 7/28/2022  2:09 PM CDT -----  Contact: Pt  Type: Needs Medical Advice    Who Called:  Pt    Best Call Back Number: [phone]    Additional Information: Pt has appt on Monday to remove a toenail & she wants to know if she can also have the toenail next to it removed at the same time.    Please call back. Thanks.

## 2022-07-29 NOTE — TELEPHONE ENCOUNTER
LMOM on that we may have to reschedule her procedure that we are currently waiting on a shipment of the medication will let her know on Monday.

## 2022-08-01 ENCOUNTER — OFFICE VISIT (OUTPATIENT)
Dept: PODIATRY | Facility: CLINIC | Age: 72
End: 2022-08-01
Payer: MEDICARE

## 2022-08-01 VITALS — BODY MASS INDEX: 30.86 KG/M2 | HEIGHT: 66 IN | WEIGHT: 192 LBS

## 2022-08-01 DIAGNOSIS — M79.674 TOE PAIN, RIGHT: ICD-10-CM

## 2022-08-01 DIAGNOSIS — L60.0 INGROWN NAIL: Primary | ICD-10-CM

## 2022-08-01 PROCEDURE — 99499 NO LOS: ICD-10-PCS | Mod: S$GLB,,, | Performed by: PODIATRIST

## 2022-08-01 PROCEDURE — 1126F PR PAIN SEVERITY QUANTIFIED, NO PAIN PRESENT: ICD-10-PCS | Mod: CPTII,S$GLB,, | Performed by: PODIATRIST

## 2022-08-01 PROCEDURE — 1126F AMNT PAIN NOTED NONE PRSNT: CPT | Mod: CPTII,S$GLB,, | Performed by: PODIATRIST

## 2022-08-01 PROCEDURE — 99999 PR PBB SHADOW E&M-EST. PATIENT-LVL II: ICD-10-PCS | Mod: PBBFAC,,, | Performed by: PODIATRIST

## 2022-08-01 PROCEDURE — 3066F NEPHROPATHY DOC TX: CPT | Mod: CPTII,S$GLB,, | Performed by: PODIATRIST

## 2022-08-01 PROCEDURE — 1101F PT FALLS ASSESS-DOCD LE1/YR: CPT | Mod: CPTII,S$GLB,, | Performed by: PODIATRIST

## 2022-08-01 PROCEDURE — 11730 AVULSION NAIL PLATE SIMPLE 1: CPT | Mod: T6,S$GLB,, | Performed by: PODIATRIST

## 2022-08-01 PROCEDURE — 3008F PR BODY MASS INDEX (BMI) DOCUMENTED: ICD-10-PCS | Mod: CPTII,S$GLB,, | Performed by: PODIATRIST

## 2022-08-01 PROCEDURE — 3051F HG A1C>EQUAL 7.0%<8.0%: CPT | Mod: CPTII,S$GLB,, | Performed by: PODIATRIST

## 2022-08-01 PROCEDURE — 3051F PR MOST RECENT HEMOGLOBIN A1C LEVEL 7.0 - < 8.0%: ICD-10-PCS | Mod: CPTII,S$GLB,, | Performed by: PODIATRIST

## 2022-08-01 PROCEDURE — 99999 PR PBB SHADOW E&M-EST. PATIENT-LVL II: CPT | Mod: PBBFAC,,, | Performed by: PODIATRIST

## 2022-08-01 PROCEDURE — 3008F BODY MASS INDEX DOCD: CPT | Mod: CPTII,S$GLB,, | Performed by: PODIATRIST

## 2022-08-01 PROCEDURE — 99499 UNLISTED E&M SERVICE: CPT | Mod: S$GLB,,, | Performed by: PODIATRIST

## 2022-08-01 PROCEDURE — 3061F PR NEG MICROALBUMINURIA RESULT DOCUMENTED/REVIEW: ICD-10-PCS | Mod: CPTII,S$GLB,, | Performed by: PODIATRIST

## 2022-08-01 PROCEDURE — 1101F PR PT FALLS ASSESS DOC 0-1 FALLS W/OUT INJ PAST YR: ICD-10-PCS | Mod: CPTII,S$GLB,, | Performed by: PODIATRIST

## 2022-08-01 PROCEDURE — 3288F FALL RISK ASSESSMENT DOCD: CPT | Mod: CPTII,S$GLB,, | Performed by: PODIATRIST

## 2022-08-01 PROCEDURE — 3066F PR DOCUMENTATION OF TREATMENT FOR NEPHROPATHY: ICD-10-PCS | Mod: CPTII,S$GLB,, | Performed by: PODIATRIST

## 2022-08-01 PROCEDURE — 11732 NAIL REMOVAL: ICD-10-PCS | Mod: T7,S$GLB,, | Performed by: PODIATRIST

## 2022-08-01 PROCEDURE — 11732 AVLSN NAIL PLATE SIMPLE EACH: CPT | Mod: T7,S$GLB,, | Performed by: PODIATRIST

## 2022-08-01 PROCEDURE — 11730 NAIL REMOVAL: ICD-10-PCS | Mod: T6,S$GLB,, | Performed by: PODIATRIST

## 2022-08-01 PROCEDURE — 3061F NEG MICROALBUMINURIA REV: CPT | Mod: CPTII,S$GLB,, | Performed by: PODIATRIST

## 2022-08-01 PROCEDURE — 3288F PR FALLS RISK ASSESSMENT DOCUMENTED: ICD-10-PCS | Mod: CPTII,S$GLB,, | Performed by: PODIATRIST

## 2022-08-01 NOTE — PATIENT INSTRUCTIONS
"POST NAIL PROCEDURE INSTRUCTIONS    1. Leave bandage intact for 12-24 hours. If the bandage sticks as you try to remove it, soak it in warm water until it lifts off.    2. Wash the toes with antibacterial soap and water separate from the body.    3. Dry foot completely after washing, and apply a small amount of bactroban ointment and a fabric or cloth bandaid ("plastic" bandaids tend to lift off with ointment use).  Wear open-toed shoes as needed for comfort.     4. Take Tylenol as needed for pain.     5. Your toe may drain for the next few days. Normal drainage is yellow-to-pink, and clear, much like the fluid in a blister. Watch for redness spreading up your toe into your foot, white thick drainage (pus), pain unrelieved by medication, or nausea/vomiting/fever/chills. These are signs of infection. Please call the clinic or visit your doctor.    "

## 2022-08-01 NOTE — PROGRESS NOTES
Subjective:      Patient ID: Reji Iraheta is a 72 y.o. female.    Chief Complaint: Ingrown Toenail (Rt 2nd 3rd 4th)    Patient presents to clinic for surgical removal of an ingrown toenail involving the Rt. 2nd and 3rd toenails, and would like the nail matrix of the Rt. 4th toe treated as well. Describes sharp pain from the affected toes with applied pressure from shoe gear.  Symptoms are alleviated with rest.  Has not been currently treating.   Denies experiencing N/V/F/C/D.  .Denies any additional pedal complaints.        Hemoglobin A1C   Date Value Ref Range Status   01/12/2022 7.1 (H) <5.7 % of total Hgb Final     Comment:     For someone without known diabetes, a hemoglobin A1c  value of 6.5% or greater indicates that they may have   diabetes and this should be confirmed with a follow-up   test.     For someone with known diabetes, a value <7% indicates   that their diabetes is well controlled and a value   greater than or equal to 7% indicates suboptimal   control. A1c targets should be individualized based on   duration of diabetes, age, comorbid conditions, and   other considerations.     Currently, no consensus exists regarding use of  hemoglobin A1c for diagnosis of diabetes for children.         07/15/2021 6.2 (H) <5.7 % of total Hgb Final     Comment:     For someone without known diabetes, a hemoglobin   A1c value between 5.7% and 6.4% is consistent with  prediabetes and should be confirmed with a   follow-up test.     For someone with known diabetes, a value <7%  indicates that their diabetes is well controlled. A1c  targets should be individualized based on duration of  diabetes, age, comorbid conditions, and other  considerations.     This assay result is consistent with an increased risk  of diabetes.     Currently, no consensus exists regarding use of  hemoglobin A1c for diagnosis of diabetes for children.        12/09/2020 6.8 (H) <5.7 % of total Hgb Final     Comment:     For someone without  known diabetes, a hemoglobin A1c  value of 6.5% or greater indicates that they may have   diabetes and this should be confirmed with a follow-up   test.     For someone with known diabetes, a value <7% indicates   that their diabetes is well controlled and a value   greater than or equal to 7% indicates suboptimal   control. A1c targets should be individualized based on   duration of diabetes, age, comorbid conditions, and   other considerations.     Currently, no consensus exists regarding use of  hemoglobin A1c for diagnosis of diabetes for children.                 Past Medical History:   Diagnosis Date    Bronchitis     Colon cancer     Edema     feet/ankle    Hiatal hernia     Hx of keloid of skin     Hypertension     Raynaud's disease     Reflux     SOB (shortness of breath)        Past Surgical History:   Procedure Laterality Date    APPENDECTOMY      CARDIAC CATHETERIZATION      CARPAL TUNNEL RELEASE Right     COLON SURGERY  2019    Marimar valentin MD  - colon cancer - second sx     COLONOSCOPY  10/25/2013    Dr. Jamar Novak    COLONOSCOPY W/ BIOPSIES  2020    ESOPHAGOGASTRODUODENOSCOPY  01/15/2019    PHARYNGEAL DILITATION      TONSILLECTOMY      TUBAL LIGATION         Family History   Problem Relation Age of Onset    Heart disease Mother     Rheum arthritis Mother     Hypertension Father          following fractured hip.     Hypertension Daughter     Cancer Maternal Aunt     Ovarian cancer Maternal Grandmother     Cancer Maternal Grandmother     Breast cancer Paternal Aunt        Social History     Socioeconomic History    Marital status:     Number of children: 2   Tobacco Use    Smoking status: Never Smoker    Smokeless tobacco: Never Used   Substance and Sexual Activity    Alcohol use: No    Drug use: Never    Sexual activity: Not Currently     Partners: Male     Birth control/protection: Post-menopausal, See Surgical Hx, None       Current  Outpatient Medications   Medication Sig Dispense Refill    acetaminophen (TYLENOL) 500 MG tablet Take by mouth.      acetaminophen-codeine 300-30mg (TYLENOL #3) 300-30 mg Tab acetaminophen 300 mg-codeine 30 mg tablet   TAKE 1 TABLET BY MOUTH EVERY 8 HOURS AS NEEDED FOR PAIN      alcohol swabs (BD ALCOHOL SWABS) PadM Apply 1 each topically once daily. 100 each 3    atorvastatin (LIPITOR) 20 MG tablet TAKE 1 TABLET (20 MG TOTAL) BY MOUTH ONCE DAILY. 90 tablet 3    blood sugar diagnostic Strp To check BG 2 times daily, to use with insurance preferred meter 200 each 3    blood-glucose meter (TRUE METRIX AIR GLUCOSE METER) kit Use daily prn glucose testing 1 each 0    clonazePAM (KLONOPIN) 0.5 MG tablet TAKE 3 TABLETS BY MOUTH AT BEDTIME 90 tablet 3    esomeprazole (NEXIUM) 40 MG capsule Take 1 capsule (40 mg total) by mouth before breakfast. 90 capsule 0    folic acid/multivit-min/lutein (CENTRUM SILVER ORAL) Take 1 tablet by mouth once daily.      gabapentin (NEURONTIN) 300 MG capsule Take 1 capsule (300 mg total) by mouth 2 (two) times daily. 60 capsule 3    JANUVIA 50 mg Tab TAKE 1 TABLET EVERY DAY 90 tablet 3    lancets Misc To check BG 2 times daily, to use with insurance preferred meter 200 each 3    metoprolol succinate (TOPROL-XL) 25 MG 24 hr tablet TAKE 1 TABLET (25 MG TOTAL) BY MOUTH ONCE DAILY. 90 tablet 3    mupirocin (BACTROBAN) 2 % ointment Apply topically once daily. 30 g 1    mv-mn/iron/folic acid/herb 190 (VITAMIN D3 COMPLETE ORAL) Take 1 tablet by mouth once daily.      nystatin (MYCOSTATIN) powder Apply topically 3 (three) times daily. 30 g 1    ondansetron (ZOFRAN-ODT) 4 MG TbDL ondansetron 4 mg disintegrating tablet   TAKE 1 TABLET (4 MG TOTAL) BY MOUTH ONCE FOR 1 DOSE      polyethylene glycol 3350 (MIRALAX ORAL) Take by mouth.      pregabalin (LYRICA) 100 MG capsule Will start med today, Patient unsure of dosage and how many times a day to take      spironolactone (ALDACTONE) 25  MG tablet TAKE 1 TABLET (25 MG TOTAL) BY MOUTH ONCE DAILY. 90 tablet 3    azelastine (ASTELIN) 137 mcg (0.1 %) nasal spray 1 SPRAY (137 MCG TOTAL) BY NASAL ROUTE 2 (TWO) TIMES DAILY. 30 mL 1    blood glucose control, low (TRUE METRIX LEVEL 1) Soln 1 application by Misc.(Non-Drug; Combo Route) route once daily. 3 each 3     No current facility-administered medications for this visit.       Review of patient's allergies indicates:   Allergen Reactions    Arb-angiotensin receptor antagonist Other (See Comments)     Uvulitis      Dyazide [triamterene-hydrochlorothiazid] Other (See Comments)     LLQ pain    Losartan Other (See Comments)     Throat swollen    Metformin Diarrhea    Ace inhibitors Other (See Comments)     cough    Elavil [amitriptyline] Swelling    Tramadol Itching    Vicodin [hydrocodone-acetaminophen] Anxiety         Review of Systems   Constitutional: Negative for chills and fever.   Cardiovascular: Negative for claudication and leg swelling.   Skin: Positive for color change and nail changes. Negative for dry skin and itching.   Musculoskeletal: Negative for muscle cramps, muscle weakness and myalgias.   Gastrointestinal: Negative for nausea and vomiting.   Neurological: Positive for paresthesias. Negative for numbness.   Psychiatric/Behavioral: Negative for altered mental status.           Objective:      Physical Exam  Constitutional:       General: She is not in acute distress.     Appearance: She is well-developed. She is not diaphoretic.   Cardiovascular:      Pulses:           Dorsalis pedis pulses are 2+ on the right side and 2+ on the left side.        Posterior tibial pulses are 2+ on the right side and 2+ on the left side.      Comments: CFT is < 3 seconds bilateral.  Pedal hair growth is decreased bilateral.  Mild nonpitting lower extremity edema noted bilateral.  Toes are cool to touch bilateral.  Musculoskeletal:         General: No tenderness.      Comments: Muscle strength 5/5  in all muscle groups bilateral.  No tenderness nor crepitation with ROM of foot/ankle joints bilateral.  Mild pain with palpation to the medial nail fold of the Rt. 2nd toe.   Bilateral semi-reducible contracture of toes 2-5.  Bilateral hallux abducto valgus.   Skin:     General: Skin is warm and dry.      Capillary Refill: Capillary refill takes less than 2 seconds.      Findings: No abrasion, bruising, burn, ecchymosis, erythema, laceration, lesion or rash.      Comments: Pedal skin appears thin bilateral.  Avulsion of the Rt. 4th toenail with exposed, epithelialized nail bed.  Incurvation noted to the medial border of the Rt. 2nd toenail and to both borders of the Rt. 3rd toenail.  No localized sign of infection noted.  The Rt. 3rd and 5th toenails appear mycotic.  Remaining toenails x 6 appear normotrophic.      Neurological:      Mental Status: She is alert and oriented to person, place, and time.      Sensory: Sensory deficit present.      Comments: Protective sensation per [place]-Zeferino monofilament is intact bilateral.  Vibratory sensation is decreased bilateral.  Light touch is intact bilateral.               Assessment:       Encounter Diagnoses   Name Primary?    Ingrown nail Yes    Toe pain, right          Plan:       Reji was seen today for ingrown toenail.    Diagnoses and all orders for this visit:    Ingrown nail  -     Nail Removal    Toe pain, right  -     Nail Removal      I counseled the patient on her conditions, their implications and medical management.      Discussed, in depth the risks, benefits, procedure, and follow up care associated with a total matrixectomy of the Rt. 2nd-4th toes.   All questions were thoroughly answered.  Consent was read, signed, and witnessed by nursing staff. See attached procedure note.       Given verbal and written wound care/dressing change instructions.      Advised to rest, ice, and elevate the surgical extremity.      RTC in 1 week for a  postop visit.     OK BradleyM

## 2022-08-02 NOTE — PROCEDURES
Nail Removal    Date/Time: 8/1/2022 1:00 PM  Performed by: Fabrizio Pascal DPM  Authorized by: Fabrizio Pascal DPM     Consent Done?:  Yes (Written)  Time out: Immediately prior to the procedure a time out was called    Prep: patient was prepped and draped in usual sterile fashion    Location:     Location:  Right foot    Location detail:  Right second toe  Anesthesia:     Anesthesia:  Digital block    Local anesthetic:  Lidocaine 1% without epinephrine    Anesthetic total (ml):  1  Procedure Details:     Preparation:  Skin prepped with alcohol and skin prepped with Betadine    Amount removed:  Complete    Wedge excision of skin of nail fold: No      Nail bed sutured?: No      Nail matrix removed:  None    Removed nail replaced and anchored: No      Dressing applied:  4x4, antibiotic ointment and dressing applied    Patient tolerance:  Patient tolerated the procedure well with no immediate complications     Applied three 30 second application of phenol.

## 2022-08-02 NOTE — PROCEDURES
Nail Removal    Date/Time: 8/1/2022 1:00 PM  Performed by: Fabrizio Pascal DPM  Authorized by: Fabrizio Pascal DPM     Consent Done?:  Yes (Written)  Time out: Immediately prior to the procedure a time out was called    Prep: patient was prepped and draped in usual sterile fashion    Location:     Location:  Right foot    Location detail:  Right third toe  Anesthesia:     Anesthesia:  Digital block    Local anesthetic:  Lidocaine 1% without epinephrine    Anesthetic total (ml):  1  Procedure Details:     Preparation:  Skin prepped with alcohol and skin prepped with Betadine    Amount removed:  Complete    Wedge excision of skin of nail fold: No      Nail bed sutured?: No      Nail matrix removed:  None    Removed nail replaced and anchored: No      Dressing applied:  4x4, antibiotic ointment and dressing applied    Patient tolerance:  Patient tolerated the procedure well with no immediate complications     Applied three 30 second application of phenol.  Also, applied the same application to the nail matrix of the Rt. 4th toe.

## 2022-08-08 ENCOUNTER — OFFICE VISIT (OUTPATIENT)
Dept: PODIATRY | Facility: CLINIC | Age: 72
End: 2022-08-08
Payer: MEDICARE

## 2022-08-08 DIAGNOSIS — Z98.890 STATUS POST NAIL SURGERY: Primary | ICD-10-CM

## 2022-08-08 PROCEDURE — 99024 POSTOP FOLLOW-UP VISIT: CPT | Mod: S$GLB,,, | Performed by: PODIATRIST

## 2022-08-08 PROCEDURE — 3066F NEPHROPATHY DOC TX: CPT | Mod: CPTII,S$GLB,, | Performed by: PODIATRIST

## 2022-08-08 PROCEDURE — 3061F NEG MICROALBUMINURIA REV: CPT | Mod: CPTII,S$GLB,, | Performed by: PODIATRIST

## 2022-08-08 PROCEDURE — 1159F MED LIST DOCD IN RCRD: CPT | Mod: CPTII,S$GLB,, | Performed by: PODIATRIST

## 2022-08-08 PROCEDURE — 99999 PR PBB SHADOW E&M-EST. PATIENT-LVL I: CPT | Mod: PBBFAC,,, | Performed by: PODIATRIST

## 2022-08-08 PROCEDURE — 3051F PR MOST RECENT HEMOGLOBIN A1C LEVEL 7.0 - < 8.0%: ICD-10-PCS | Mod: CPTII,S$GLB,, | Performed by: PODIATRIST

## 2022-08-08 PROCEDURE — 3051F HG A1C>EQUAL 7.0%<8.0%: CPT | Mod: CPTII,S$GLB,, | Performed by: PODIATRIST

## 2022-08-08 PROCEDURE — 1126F PR PAIN SEVERITY QUANTIFIED, NO PAIN PRESENT: ICD-10-PCS | Mod: CPTII,S$GLB,, | Performed by: PODIATRIST

## 2022-08-08 PROCEDURE — 99999 PR PBB SHADOW E&M-EST. PATIENT-LVL I: ICD-10-PCS | Mod: PBBFAC,,, | Performed by: PODIATRIST

## 2022-08-08 PROCEDURE — 1159F PR MEDICATION LIST DOCUMENTED IN MEDICAL RECORD: ICD-10-PCS | Mod: CPTII,S$GLB,, | Performed by: PODIATRIST

## 2022-08-08 PROCEDURE — 99024 PR POST-OP FOLLOW-UP VISIT: ICD-10-PCS | Mod: S$GLB,,, | Performed by: PODIATRIST

## 2022-08-08 PROCEDURE — 3066F PR DOCUMENTATION OF TREATMENT FOR NEPHROPATHY: ICD-10-PCS | Mod: CPTII,S$GLB,, | Performed by: PODIATRIST

## 2022-08-08 PROCEDURE — 3061F PR NEG MICROALBUMINURIA RESULT DOCUMENTED/REVIEW: ICD-10-PCS | Mod: CPTII,S$GLB,, | Performed by: PODIATRIST

## 2022-08-08 PROCEDURE — 1126F AMNT PAIN NOTED NONE PRSNT: CPT | Mod: CPTII,S$GLB,, | Performed by: PODIATRIST

## 2022-08-08 NOTE — PROGRESS NOTES
Subjective:      Patient ID: Reji Iraheta is a 72 y.o. female.    Chief Complaint: s/p nail removal  (Rt foot - 08/01/22)    Patient presents to clinic for one week S/P total matrixectomy of the Rt. 2nd - 4th toenails.  Denies pain from the surgical extremity with today's exam.  She has been applying bactroban and a band aid to the sites as instructed on a daily basis.  States the adhesive has irritated the skin but otherwise has had little issue.  Continues wearing shoe gear that limits pressure to the affected areas.  Denies experiencing N/V/F/C/D.  Denies any additional pedal complaints.        Hemoglobin A1C   Date Value Ref Range Status   01/12/2022 7.1 (H) <5.7 % of total Hgb Final     Comment:     For someone without known diabetes, a hemoglobin A1c  value of 6.5% or greater indicates that they may have   diabetes and this should be confirmed with a follow-up   test.     For someone with known diabetes, a value <7% indicates   that their diabetes is well controlled and a value   greater than or equal to 7% indicates suboptimal   control. A1c targets should be individualized based on   duration of diabetes, age, comorbid conditions, and   other considerations.     Currently, no consensus exists regarding use of  hemoglobin A1c for diagnosis of diabetes for children.         07/15/2021 6.2 (H) <5.7 % of total Hgb Final     Comment:     For someone without known diabetes, a hemoglobin   A1c value between 5.7% and 6.4% is consistent with  prediabetes and should be confirmed with a   follow-up test.     For someone with known diabetes, a value <7%  indicates that their diabetes is well controlled. A1c  targets should be individualized based on duration of  diabetes, age, comorbid conditions, and other  considerations.     This assay result is consistent with an increased risk  of diabetes.     Currently, no consensus exists regarding use of  hemoglobin A1c for diagnosis of diabetes for children.        12/09/2020  6.8 (H) <5.7 % of total Hgb Final     Comment:     For someone without known diabetes, a hemoglobin A1c  value of 6.5% or greater indicates that they may have   diabetes and this should be confirmed with a follow-up   test.     For someone with known diabetes, a value <7% indicates   that their diabetes is well controlled and a value   greater than or equal to 7% indicates suboptimal   control. A1c targets should be individualized based on   duration of diabetes, age, comorbid conditions, and   other considerations.     Currently, no consensus exists regarding use of  hemoglobin A1c for diagnosis of diabetes for children.                 Past Medical History:   Diagnosis Date    Bronchitis     Colon cancer 2019    Edema     feet/ankle    Hiatal hernia     Hx of keloid of skin     Hypertension     Raynaud's disease     Reflux     SOB (shortness of breath)        Past Surgical History:   Procedure Laterality Date    APPENDECTOMY      CARDIAC CATHETERIZATION      CARPAL TUNNEL RELEASE Right     COLON SURGERY  2019    Marimar valentin MD  - colon cancer - second sx     COLONOSCOPY  10/25/2013    Dr. Jamar Novak    COLONOSCOPY W/ BIOPSIES  2020    ESOPHAGOGASTRODUODENOSCOPY  01/15/2019    PHARYNGEAL DILITATION      TONSILLECTOMY      TUBAL LIGATION         Family History   Problem Relation Age of Onset    Heart disease Mother     Rheum arthritis Mother     Hypertension Father          following fractured hip.     Hypertension Daughter     Cancer Maternal Aunt     Ovarian cancer Maternal Grandmother     Cancer Maternal Grandmother     Breast cancer Paternal Aunt        Social History     Socioeconomic History    Marital status:     Number of children: 2   Tobacco Use    Smoking status: Never Smoker    Smokeless tobacco: Never Used   Substance and Sexual Activity    Alcohol use: No    Drug use: Never    Sexual activity: Not Currently     Partners: Male     Birth  control/protection: Post-menopausal, See Surgical Hx, None       Current Outpatient Medications   Medication Sig Dispense Refill    acetaminophen (TYLENOL) 500 MG tablet Take by mouth.      acetaminophen-codeine 300-30mg (TYLENOL #3) 300-30 mg Tab acetaminophen 300 mg-codeine 30 mg tablet   TAKE 1 TABLET BY MOUTH EVERY 8 HOURS AS NEEDED FOR PAIN      alcohol swabs (BD ALCOHOL SWABS) PadM Apply 1 each topically once daily. 100 each 3    atorvastatin (LIPITOR) 20 MG tablet TAKE 1 TABLET (20 MG TOTAL) BY MOUTH ONCE DAILY. 90 tablet 3    azelastine (ASTELIN) 137 mcg (0.1 %) nasal spray 1 SPRAY (137 MCG TOTAL) BY NASAL ROUTE 2 (TWO) TIMES DAILY. 30 mL 1    blood glucose control, low (TRUE METRIX LEVEL 1) Soln 1 application by Misc.(Non-Drug; Combo Route) route once daily. 3 each 3    blood sugar diagnostic Strp To check BG 2 times daily, to use with insurance preferred meter 200 each 3    blood-glucose meter (TRUE METRIX AIR GLUCOSE METER) kit Use daily prn glucose testing 1 each 0    clonazePAM (KLONOPIN) 0.5 MG tablet TAKE 3 TABLETS BY MOUTH AT BEDTIME 90 tablet 3    esomeprazole (NEXIUM) 40 MG capsule Take 1 capsule (40 mg total) by mouth before breakfast. 90 capsule 0    folic acid/multivit-min/lutein (CENTRUM SILVER ORAL) Take 1 tablet by mouth once daily.      gabapentin (NEURONTIN) 300 MG capsule Take 1 capsule (300 mg total) by mouth 2 (two) times daily. 60 capsule 3    JANUVIA 50 mg Tab TAKE 1 TABLET EVERY DAY 90 tablet 3    lancets Misc To check BG 2 times daily, to use with insurance preferred meter 200 each 3    metoprolol succinate (TOPROL-XL) 25 MG 24 hr tablet TAKE 1 TABLET (25 MG TOTAL) BY MOUTH ONCE DAILY. 90 tablet 3    mupirocin (BACTROBAN) 2 % ointment Apply topically once daily. 30 g 1    mv-mn/iron/folic acid/herb 190 (VITAMIN D3 COMPLETE ORAL) Take 1 tablet by mouth once daily.      nystatin (MYCOSTATIN) powder Apply topically 3 (three) times daily. 30 g 1    ondansetron  (ZOFRAN-ODT) 4 MG TbDL ondansetron 4 mg disintegrating tablet   TAKE 1 TABLET (4 MG TOTAL) BY MOUTH ONCE FOR 1 DOSE      polyethylene glycol 3350 (MIRALAX ORAL) Take by mouth.      pregabalin (LYRICA) 100 MG capsule Will start med today, Patient unsure of dosage and how many times a day to take      spironolactone (ALDACTONE) 25 MG tablet TAKE 1 TABLET (25 MG TOTAL) BY MOUTH ONCE DAILY. 90 tablet 3     No current facility-administered medications for this visit.       Review of patient's allergies indicates:   Allergen Reactions    Arb-angiotensin receptor antagonist Other (See Comments)     Uvulitis      Dyazide [triamterene-hydrochlorothiazid] Other (See Comments)     LLQ pain    Losartan Other (See Comments)     Throat swollen    Metformin Diarrhea    Ace inhibitors Other (See Comments)     cough    Elavil [amitriptyline] Swelling    Tramadol Itching    Vicodin [hydrocodone-acetaminophen] Anxiety         Review of Systems   Constitutional: Negative for chills and fever.   Cardiovascular: Negative for claudication and leg swelling.   Skin: Positive for color change and nail changes. Negative for dry skin and itching.   Musculoskeletal: Negative for muscle cramps, muscle weakness and myalgias.   Gastrointestinal: Negative for nausea and vomiting.   Neurological: Positive for paresthesias. Negative for numbness.   Psychiatric/Behavioral: Negative for altered mental status.           Objective:      Physical Exam  Constitutional:       General: She is not in acute distress.     Appearance: She is well-developed. She is not diaphoretic.   Cardiovascular:      Pulses:           Dorsalis pedis pulses are 2+ on the right side and 2+ on the left side.        Posterior tibial pulses are 2+ on the right side and 2+ on the left side.      Comments: CFT is < 3 seconds bilateral.  Pedal hair growth is decreased bilateral.  Mild nonpitting lower extremity edema noted bilateral.  Toes are cool to touch  bilateral.  Musculoskeletal:         General: No tenderness.      Comments: Muscle strength 5/5 in all muscle groups bilateral.  No tenderness nor crepitation with ROM of foot/ankle joints bilateral.  (-) for pain with palpation of bilateral foot and ankle.  Bilateral semi-reducible contracture of toes 2-5.  Bilateral hallux abducto valgus.   Skin:     General: Skin is warm and dry.      Capillary Refill: Capillary refill takes less than 2 seconds.      Findings: No abrasion, bruising, burn, ecchymosis, erythema, laceration, lesion or rash.      Comments: Surgical absence of the Rt. 2nd-4th toenails.  Mature epithelium noted to the exposed nail bed of the Rt. 3rd and 4th toes.  Fragile epithelium noted to the exposed Rt. 2nd nail bed.  No localized sign of infection noted.     Neurological:      Mental Status: She is alert and oriented to person, place, and time.      Sensory: Sensory deficit present.      Comments: Protective sensation per [place]-Zeferino monofilament is intact bilateral.  Vibratory sensation is decreased bilateral.  Light touch is intact bilateral.               Assessment:       Encounter Diagnosis   Name Primary?    Status post nail surgery Yes         Plan:       Reji was seen today for s/p nail removal .    Diagnoses and all orders for this visit:    Status post nail surgery      I counseled the patient on her conditions, their implications and medical management.      Overall, satisfactory postoperative results noted.    Being that the Rt. 2nd toenail bed is still healing, advised to continue applying bactroban and a bandage to the site once daily for three final days.  No other intervention required for the Rt. 3rd and 4th toes, as the nail beds are fully epithelialized    Patient to resume weight bearing to tolerance in casual shoe gear.    May resume a normal showering routine.    RTC prn.     Fabrizio Pascal DPM

## 2023-07-31 PROBLEM — E11.69 TYPE 2 DIABETES MELLITUS WITH HYPERLIPIDEMIA: Chronic | Status: ACTIVE | Noted: 2023-07-31

## 2023-07-31 PROBLEM — E78.5 TYPE 2 DIABETES MELLITUS WITH HYPERLIPIDEMIA: Chronic | Status: ACTIVE | Noted: 2023-07-31

## 2023-07-31 PROBLEM — E11.42 DIABETIC PERIPHERAL NEUROPATHY ASSOCIATED WITH TYPE 2 DIABETES MELLITUS: Chronic | Status: ACTIVE | Noted: 2021-10-12

## 2023-07-31 PROBLEM — F41.8 SITUATIONAL ANXIETY: Chronic | Status: ACTIVE | Noted: 2018-07-10

## 2023-07-31 PROBLEM — I27.20 PULMONARY HYPERTENSION: Chronic | Status: ACTIVE | Noted: 2017-05-02

## 2024-02-01 ENCOUNTER — PATIENT MESSAGE (OUTPATIENT)
Dept: HEMATOLOGY/ONCOLOGY | Facility: CLINIC | Age: 74
End: 2024-02-01
Payer: MEDICARE

## 2024-04-26 ENCOUNTER — TELEPHONE (OUTPATIENT)
Dept: OTOLARYNGOLOGY | Facility: CLINIC | Age: 74
End: 2024-04-26
Payer: MEDICARE

## 2024-04-26 NOTE — TELEPHONE ENCOUNTER
----- Message from Shasta Stallworth sent at 4/26/2024  1:09 PM CDT -----  Regarding: r/s apt  Contact: pt  Type: Needs Medical Advice  Who Called:  patient   Symptoms (please be specific):  rs apt   How long has patient had these symptoms:    Pharmacy name and phone #:    Best Call Back Number: [phone]'  Additional Information: thanks

## 2024-04-26 NOTE — TELEPHONE ENCOUNTER
S/w pt to r/s her appt and pt states that she has no idea why she is even needing this appt. Advised pt that the referral if for a wax impaction in her right ear. Pt states that she does not have any problems with her ears. Pt does not think that she even needs this appt. Pt states that if she has any problems in the future, she will call and schedule an appt but for now she does not wish to schedule anything.

## 2024-06-03 ENCOUNTER — TELEPHONE (OUTPATIENT)
Dept: PODIATRY | Facility: CLINIC | Age: 74
End: 2024-06-03
Payer: MEDICARE

## 2024-06-03 NOTE — TELEPHONE ENCOUNTER
----- Message from Shante Sanchezsteven sent at 6/3/2024  3:33 PM CDT -----  Contact: self  Type:  Sooner Appointment Request    Caller is requesting a sooner appointment.  Caller declined first available appointment listed below.  Caller will not accept being placed on the waitlist and is requesting a message be sent to doctor.    Name of Caller:  patient   When is the first available appointment?  July 15  Symptoms:  right foot red area on top and left big toe black spot under nail  Would the patient rather a call back or a response via [facility]? Call back  Best Call Back Number:  [phone]  Additional Information:  thanks

## 2024-06-25 ENCOUNTER — OFFICE VISIT (OUTPATIENT)
Dept: PODIATRY | Facility: CLINIC | Age: 74
End: 2024-06-25
Payer: MEDICARE

## 2024-06-25 VITALS — HEIGHT: 66 IN | BODY MASS INDEX: 30.4 KG/M2 | WEIGHT: 189.13 LBS

## 2024-06-25 DIAGNOSIS — S90.219A SUBUNGUAL HEMATOMA OF GREAT TOE: Primary | ICD-10-CM

## 2024-06-25 DIAGNOSIS — L23.9: ICD-10-CM

## 2024-06-25 PROCEDURE — 1101F PT FALLS ASSESS-DOCD LE1/YR: CPT | Mod: CPTII,S$GLB,, | Performed by: PODIATRIST

## 2024-06-25 PROCEDURE — 1159F MED LIST DOCD IN RCRD: CPT | Mod: CPTII,S$GLB,, | Performed by: PODIATRIST

## 2024-06-25 PROCEDURE — 1126F AMNT PAIN NOTED NONE PRSNT: CPT | Mod: CPTII,S$GLB,, | Performed by: PODIATRIST

## 2024-06-25 PROCEDURE — 3288F FALL RISK ASSESSMENT DOCD: CPT | Mod: CPTII,S$GLB,, | Performed by: PODIATRIST

## 2024-06-25 PROCEDURE — 99213 OFFICE O/P EST LOW 20 MIN: CPT | Mod: S$GLB,,, | Performed by: PODIATRIST

## 2024-06-25 PROCEDURE — 99999 PR PBB SHADOW E&M-EST. PATIENT-LVL III: CPT | Mod: PBBFAC,,, | Performed by: PODIATRIST

## 2024-06-25 RX ORDER — BETAMETHASONE VALERATE 1 MG/ML
LOTION CUTANEOUS 2 TIMES DAILY
Qty: 60 ML | Refills: 1 | Status: SHIPPED | OUTPATIENT
Start: 2024-06-25

## 2024-06-25 NOTE — PROGRESS NOTES
Subjective:     Patient ID: Reji Iraheta is a 74 y.o. female.    Chief Complaint: Foot Problem and Nail Problem (Left foot, great toe black spot under nailbed, right foot rash on top of foot (4 mths))    Reji is a 74 y.o. female with a past medical history of Bronchitis, Colon cancer (2019), Edema, Hiatal hernia, keloid of skin, Hypertension, Raynaud's disease, Reflux, and SOB (shortness of breath). The patient's chief complaint consists of dark discoloration to the medial border of the Lt. Hallux toenail.  States this has been present for months.  Denies an inciting event or specific injury leading to said nail changes.  Has not attempted to self treat.  Inquires as to whether additional intervention is warranted.  Also, notes having a rash to the dorsum of the Rt. Foot that has been present for over 4 months.  She states this occurred in conjunction with wearing a pair of Isotona slippers.  She has since been applying mupirocin and econazole nitrate to the site with no obvious improvement.  Relates discontinuing use of said shoe gear with moderate improvement noted.  Notes the site is pruritic but denies breakdown in the skin to the area.  Inquires as to how this can be resolved.  Denies any additional pedal complaints.      Past Medical History:   Diagnosis Date    Bronchitis     Colon cancer 2019    Edema     feet/ankle    Hiatal hernia     Hx of keloid of skin     Hypertension     Raynaud's disease     Reflux     SOB (shortness of breath)        Past Surgical History:   Procedure Laterality Date    APPENDECTOMY      CARDIAC CATHETERIZATION      CARPAL TUNNEL RELEASE Right     COLON SURGERY  05/2019    Marimar valentin MD  - colon cancer - second sx     COLONOSCOPY  10/25/2013    Dr. Jamar Novak    COLONOSCOPY W/ BIOPSIES  01/22/2020    ESOPHAGOGASTRODUODENOSCOPY  01/15/2019    PHARYNGEAL DILITATION      TONSILLECTOMY      TUBAL LIGATION         Family History   Problem Relation Name Age of Onset     Heart disease Mother      Rheum arthritis Mother      Hypertension Father           following fractured hip.     Hypertension Daughter      Cancer Maternal Aunt      Ovarian cancer Maternal Grandmother      Cancer Maternal Grandmother      Breast cancer Paternal Aunt         Social History     Socioeconomic History    Marital status:     Number of children: 2   Tobacco Use    Smoking status: Never    Smokeless tobacco: Never   Substance and Sexual Activity    Alcohol use: No    Drug use: Never    Sexual activity: Not Currently     Partners: Male     Birth control/protection: Post-menopausal, See Surgical Hx, None   Social History Narrative    ** Merged History Encounter **          Social Determinants of Health     Stress: Stress Concern Present (2020)    [facility] [place] [facility] Occup[employer] - Occupational Stress Questionnaire     Feeling of Stress : To some extent       Current Outpatient Medications   Medication Sig Dispense Refill    aspirin (ECOTRIN) 81 MG EC tablet Take 81 mg by mouth.      blood sugar diagnostic Strp Use to check blood sugar daily  true metrix strips 100 each 3    blood-glucose meter (TRUE METRIX AIR GLUCOSE METER) kit Use daily for  glucose testing 1 each 0    clonazePAM (KLONOPIN) 0.5 MG tablet TAKE 3 TABLETS NIGHTLY AS NEEDED. DO NOT TAKE ANY SOONER THAN 4 HRS BEFORE LAST DOSE OF GABAPENTIN. 90 tablet 1    esomeprazole (NEXIUM) 40 MG capsule Take 1 capsule (40 mg total) by mouth before breakfast. 90 capsule 3    folic acid/multivit-min/lutein (CENTRUM SILVER ORAL) Take 1 tablet by mouth once daily.      gabapentin (NEURONTIN) 300 MG capsule 1 in am and 2 hs for diabetic neuropathy 270 capsule 3    lancets Misc Use to take blood sugar daily 100 each 3    metoprolol succinate (TOPROL-XL) 25 MG 24 hr tablet TAKE 1 TABLET EVERY DAY 90 tablet 1    mv-mn/iron/folic acid/herb 190 (VITAMIN D3 COMPLETE ORAL) Take 1 tablet by mouth once daily.      SITagliptin phosphate (JANUVIA)  50 MG Tab Take 1 tablet (50 mg total) by mouth once daily. 90 tablet 3    spironolactone (ALDACTONE) 25 MG tablet TAKE 1 TABLET EVERY DAY 90 tablet 1    TRUEPLUS LANCETS 33 gauge Misc       acetaminophen (TYLENOL) 500 MG tablet Take by mouth. (Patient not taking: Reported on 6/17/2024)      alcohol swabs (BD ALCOHOL SWABS) PadM Apply 1 each topically once daily. (Patient not taking: Reported on 6/17/2024) 100 each 3    atorvastatin (LIPITOR) 40 MG tablet Take 40 mg by mouth.      betamethasone valerate 0.1% (VALISONE) 0.1 % Lotn Apply topically 2 (two) times daily. 60 mL 1    blood glucose control, low (TRUE METRIX LEVEL 1) Soln 1 application by Misc.(Non-Drug; Combo Route) route once daily. 3 each 3     No current facility-administered medications for this visit.       Review of patient's allergies indicates:   Allergen Reactions    Arb-angiotensin receptor antagonist Other (See Comments) and Swelling     Uvulitis    Uvulitis  Throat swollen  Throat swelling      Dyazide [triamterene-hydrochlorothiazid] Other (See Comments)     LLQ pain    Losartan Other (See Comments)     Throat swollen    Metformin Diarrhea    Ace inhibitors Other (See Comments)     cough    Elavil [amitriptyline] Swelling    Tramadol Itching and Other (See Comments)    Celecoxib Nausea Only    Hydrocodone-acetaminophen Anxiety and Other (See Comments)        Hemoglobin A1C   Date Value Ref Range Status   02/02/2024 7.1 (H) <5.7 % of total Hgb Final     Comment:     For someone without known diabetes, a hemoglobin A1c  value of 6.5% or greater indicates that they may have   diabetes and this should be confirmed with a follow-up   test.     For someone with known diabetes, a value <7% indicates   that their diabetes is well controlled and a value   greater than or equal to 7% indicates suboptimal   control. A1c targets should be individualized based on   duration of diabetes, age, comorbid conditions, and   other considerations.     Currently, no  consensus exists regarding use of  hemoglobin A1c for diagnosis of diabetes for children.      HbA1c performed on Roche platform.  Effective 11/13/23 a change in test platforms may have   shifted HbA1c results compared to historical results.         09/06/2023 6.4 (H) <5.7 % of total Hgb Final     Comment:     For someone without known diabetes, a hemoglobin   A1c value between 5.7% and 6.4% is consistent with  prediabetes and should be confirmed with a   follow-up test.     For someone with known diabetes, a value <7%  indicates that their diabetes is well controlled. A1c  targets should be individualized based on duration of  diabetes, age, comorbid conditions, and other  considerations.     This assay result is consistent with an increased risk  of diabetes.     Currently, no consensus exists regarding use of  hemoglobin A1c for diagnosis of diabetes for children.        01/24/2023 6.3 (H) <5.7 % of total Hgb Final     Comment:     For someone without known diabetes, a hemoglobin   A1c value between 5.7% and 6.4% is consistent with  prediabetes and should be confirmed with a   follow-up test.     For someone with known diabetes, a value <7%  indicates that their diabetes is well controlled. A1c  targets should be individualized based on duration of  diabetes, age, comorbid conditions, and other  considerations.     This assay result is consistent with an increased risk  of diabetes.     Currently, no consensus exists regarding use of  hemoglobin A1c for diagnosis of diabetes for children.            Review of Systems   Constitutional: Negative for chills and fever.   Skin:  Positive for color change, nail changes and rash.   Musculoskeletal:  Negative for joint swelling, muscle cramps, muscle weakness and myalgias.   Gastrointestinal:  Negative for nausea and vomiting.   Neurological:  Negative for numbness and paresthesias.   Psychiatric/Behavioral:  Negative for altered mental status.         Objective:      Physical Exam  Constitutional:       General: She is not in acute distress.     Appearance: She is well-developed. She is not diaphoretic.   Cardiovascular:      Pulses:           Dorsalis pedis pulses are 2+ on the right side and 2+ on the left side.        Posterior tibial pulses are 2+ on the right side and 2+ on the left side.      Comments: CFT is < 3 seconds bilateral.  Pedal hair growth is decreased bilateral.  Mild nonpitting lower extremity edema noted bilateral.  Toes are cool to touch bilateral.  Musculoskeletal:         General: No tenderness.      Comments: Muscle strength 5/5 in all muscle groups bilateral.  No tenderness nor crepitation with ROM of foot/ankle joints bilateral.  (-) for pain with palpation of bilateral foot and ankle.  Bilateral semi-reducible contracture of toes 2-5.  Bilateral hallux abducto valgus.   Skin:     General: Skin is warm and dry.      Capillary Refill: Capillary refill takes less than 2 seconds.      Findings: Bruising, ecchymosis and rash present. No abrasion, burn, erythema, laceration or lesion. Rash is papular.      Comments: Small area of ecchymosis noted to the Lt. Medial hallux nail border.  There is slight proximal clearance noted at the proximal nail fold.  Fine papular rash noted to the dorsum of the Rt. Forefoot at the base of the toes with extension to the surgical necks of metatarsals 2-5.     Neurological:      Mental Status: She is alert and oriented to person, place, and time.      Sensory: Sensory deficit present.      Comments: Protective sensation per [place] monofilament is intact bilateral.  Vibratory sensation is decreased bilateral.  Light touch is intact bilateral.           Assessment:      Encounter Diagnoses   Name Primary?    Subungual hematoma of great toe Yes    Allergic contact dermatitis of foot - Right Foot      Plan:     Reji was seen today for foot problem and nail problem.    Diagnoses and all orders for this  visit:    Subungual hematoma of great toe    Allergic contact dermatitis of foot - Right Foot  -     betamethasone valerate 0.1% (VALISONE) 0.1 % Lotn; Apply topically 2 (two) times daily.      I counseled the patient on her conditions, their implications and medical management.    Based on today's exam, Lt. Hallux toenail discoloration is secondary to a hematoma.  No further intervention is warranted, as this will grow to length with healthy underlying nail to follow.      Upon evaluating the rash of the Rt. Forefoot, I suspect this is contact dermatitis due to a dye present in the fabric of the aforementioned slippers.      Rx written for valisone.  To be applied to the affected area BID x 2-3 weeks.    Patient to relay the efficacy of the aforementioned therapy in two weeks through her mychart.    RTC in 2 months to assess changes of the Lt. Hallux toenail.    Fabrizio Pascal DPM

## 2024-07-01 ENCOUNTER — TELEPHONE (OUTPATIENT)
Dept: PODIATRY | Facility: CLINIC | Age: 74
End: 2024-07-01
Payer: MEDICARE

## 2024-07-01 NOTE — TELEPHONE ENCOUNTER
Called patient to let her know that the requested item has already been sent to the mailroom for delivery and that she should be receiving it soon. Phone went to voicemail and a message was left.

## 2024-07-01 NOTE — TELEPHONE ENCOUNTER
----- Message from Dc Agosto sent at 6/28/2024  3:48 PM CDT -----  Regarding: advice  Contact: patient  Type: Needs Medical Advice  Who Called:  patient   Symptoms (please be specific):    How long has patient had these symptoms:    Pharmacy name and phone #:    Best Call Back Number: [phone]  Additional Information: pt stated that she would like to to get the last vist summary mailed to her. Please call to advise. Thanks